# Patient Record
Sex: FEMALE | Race: WHITE | NOT HISPANIC OR LATINO | Employment: OTHER | URBAN - METROPOLITAN AREA
[De-identification: names, ages, dates, MRNs, and addresses within clinical notes are randomized per-mention and may not be internally consistent; named-entity substitution may affect disease eponyms.]

---

## 2021-10-04 ENCOUNTER — ANNUAL EXAM (OUTPATIENT)
Dept: OBGYN CLINIC | Facility: CLINIC | Age: 86
End: 2021-10-04
Payer: MEDICARE

## 2021-10-04 VITALS
WEIGHT: 174 LBS | HEIGHT: 66 IN | SYSTOLIC BLOOD PRESSURE: 140 MMHG | BODY MASS INDEX: 27.97 KG/M2 | DIASTOLIC BLOOD PRESSURE: 80 MMHG

## 2021-10-04 DIAGNOSIS — K64.9 HEMORRHOIDS, UNSPECIFIED HEMORRHOID TYPE: ICD-10-CM

## 2021-10-04 DIAGNOSIS — L90.0 LICHEN SCLEROSUS ET ATROPHICUS: Primary | ICD-10-CM

## 2021-10-04 DIAGNOSIS — S32.9XXS CLOSED DISPLACED FRACTURE OF PELVIS, UNSPECIFIED PART OF PELVIS, SEQUELA: ICD-10-CM

## 2021-10-04 DIAGNOSIS — Z53.20 MAMMOGRAM DECLINED: ICD-10-CM

## 2021-10-04 PROCEDURE — 99214 OFFICE O/P EST MOD 30 MIN: CPT | Performed by: OBSTETRICS & GYNECOLOGY

## 2021-10-04 RX ORDER — TRIAMCINOLONE ACETONIDE 5 MG/G
OINTMENT TOPICAL
Qty: 30 G | Refills: 4 | Status: SHIPPED | OUTPATIENT
Start: 2021-10-04

## 2024-06-18 ENCOUNTER — ANNUAL EXAM (OUTPATIENT)
Dept: OBGYN CLINIC | Facility: CLINIC | Age: 89
End: 2024-06-18
Payer: MEDICARE

## 2024-06-18 VITALS
DIASTOLIC BLOOD PRESSURE: 82 MMHG | BODY MASS INDEX: 21.21 KG/M2 | SYSTOLIC BLOOD PRESSURE: 140 MMHG | WEIGHT: 132 LBS | HEIGHT: 66 IN

## 2024-06-18 DIAGNOSIS — N76.3 CHRONIC VULVITIS: ICD-10-CM

## 2024-06-18 DIAGNOSIS — Z01.411 ENCOUNTER FOR GYNECOLOGICAL EXAMINATION WITH ABNORMAL FINDING: Primary | ICD-10-CM

## 2024-06-18 DIAGNOSIS — L90.0 LICHEN SCLEROSUS ET ATROPHICUS: ICD-10-CM

## 2024-06-18 DIAGNOSIS — N95.2 ATROPHIC VAGINITIS: ICD-10-CM

## 2024-06-18 DIAGNOSIS — Z99.89 USES ROLLER WALKER: ICD-10-CM

## 2024-06-18 PROCEDURE — G0101 CA SCREEN;PELVIC/BREAST EXAM: HCPCS | Performed by: OBSTETRICS & GYNECOLOGY

## 2024-06-18 RX ORDER — CALCIUM ACETATE 667 MG/1
667 CAPSULE ORAL
COMMUNITY

## 2024-06-18 RX ORDER — AMLODIPINE BESYLATE 5 MG/1
5 TABLET ORAL DAILY
COMMUNITY
Start: 2024-03-28

## 2024-06-18 RX ORDER — VITS A,C,E/LUTEIN/MINERALS 300MCG-200
1 TABLET ORAL DAILY
COMMUNITY

## 2024-06-18 RX ORDER — HYDROXYCHLOROQUINE SULFATE 200 MG/1
200 TABLET, FILM COATED ORAL 2 TIMES DAILY
COMMUNITY
Start: 2024-05-07

## 2024-06-18 RX ORDER — TRIAMCINOLONE ACETONIDE 5 MG/G
OINTMENT TOPICAL
Qty: 30 G | Refills: 4 | Status: SHIPPED | OUTPATIENT
Start: 2024-06-18

## 2024-06-18 RX ORDER — LEVOTHYROXINE SODIUM 0.03 MG/1
25 TABLET ORAL DAILY
COMMUNITY

## 2024-06-18 NOTE — PROGRESS NOTES
[facility] - [address], [address], [place] [address]    ASSESSMENT/PLAN: Rhiannon Parisi is a 89 y.o.  who presents for annual gynecologic exam.    Encounter for routine gynecologic examination  - Routine well woman exam completed today.  - Cervical Cancer Screening: Current ASCCP Guidelines reviewed. Last Pap: 2020 . Next Pap Due: past age 65   - Breast Cancer Screening: Last Mammogram Not on file, declines   - Colorectal cancer screening was not ordered.  - The following were reviewed in today's visit: breast self exam, adequate intake of calcium and vitamin D, exercise, and healthy diet    Additional problems addressed during this visit:  1. Encounter for gynecological examination with abnormal finding  2. Lichen sclerosus et atrophicus  Comments:  Kenalog  ointment  bid   in 3 swipes for   3 weeks  week 4 off weeks  5-9 once a day  May use Aquaphor between  Orders:  -     triamcinolone (KENALOG) 0.5 % ointment; Apply to  Perineum as directed  Every other  Day for  First  Three weeks of the month .  Week  Four  Take off. May use Aquaphor healing ointment between.  3. Chronic vulvitis  4. Atrophic vaginitis  5. Uses roller walker  Comments:  Pt states lives alone and getting difficult.  DW pt social work  referral and [facility] on aging  Orders:  -     Ambulatory Referral to Social Work Care Management Program; Future      CC:  Annual Gynecologic Examination    HPI: Rhiannon Parisi is a 89 y.o.  who presents for annual gynecologic examination.  88 yo her for wellness exam.   Pt not seen in a few  years.  No bleeding.. bloating .  + diarrhea and can get very irritated.   Sore all the time.        The following portions of the patient's history were reviewed and updated as appropriate: She  has a past medical history of Colon polyps, Hemorrhoids, and Lichen sclerosus.  She  has a past surgical history that includes Cataract extraction, bilateral; Mammo  "(historical); DXA procedure(historical) (2019); Bartholin gland cyst excision (2020); and Back surgery.  Her family history is not on file.  She  reports that she has never smoked. She has never used smokeless tobacco. She reports that she does not currently use alcohol. She reports that she does not currently use drugs.  Current Outpatient Medications   Medication Sig Dispense Refill   • amLODIPine (NORVASC) 5 mg tablet Take 5 mg by mouth daily     • calcium acetate (PHOSLO) capsule Take 667 mg by mouth 3 (three) times a day with meals     • denosumab (PROLIA) 60 mg/mL Inject 60 mg under the skin once     • hydroxychloroquine (PLAQUENIL) 200 mg tablet Take 200 mg by mouth 2 (two) times a day     • levothyroxine 25 mcg tablet Take 25 mcg by mouth daily     • metoprolol tartrate (LOPRESSOR) 25 mg tablet Take 12.5 mg by mouth     • Ocuvite-Lutein (OCUVITE-LUTEIN) tablet Take 1 tablet by mouth daily     • triamcinolone (KENALOG) 0.5 % ointment Apply to  Perineum as directed  Every other  Day for  First  Three weeks of the month .  Week  Four  Take off. May use Aquaphor healing ointment between. 30 g 4   • vitamin A 2250 MCG (7500 UT) capsule Take 7,500 Units by mouth daily       No current facility-administered medications for this visit.     She has No Known Allergies..    Review of Systems:  All systems normal except as noted in HPI          Objective:  /82 (BP Location: Left arm, Patient Position: Sitting, Cuff Size: Standard)   Ht 5' 6\" (1.676 m)   Wt 59.9 kg (132 lb)   BMI 21.31 kg/m²    Physical Exam  Vitals and nursing note reviewed.   Constitutional:       Appearance: Normal appearance.   HENT:      Head: Normocephalic.   Cardiovascular:      Rate and Rhythm: Rhythm irregular.      Pulses: Normal pulses.   Pulmonary:      Effort: Pulmonary effort is normal.      Breath sounds: Normal breath sounds.   Chest:      Chest wall: No mass, lacerations, swelling, tenderness or edema.   Breasts:     Bijan " Score is 4.      Breasts are symmetrical.      Right: Normal. No swelling, bleeding, inverted nipple, mass, nipple discharge, skin change or tenderness.      Left: No swelling, bleeding, inverted nipple, mass, nipple discharge, skin change or tenderness.   Abdominal:      General: Abdomen is flat. Bowel sounds are normal.      Palpations: Abdomen is soft.   Genitourinary:     General: Normal vulva.      Exam position: Lithotomy position.      Pubic Area: No rash.       Bijan stage (genital): 4.      Labia:         Right: Rash present. No tenderness or lesion.         Left: Rash present. No tenderness or lesion.       Urethra: No urethral pain, urethral swelling or urethral lesion.      Vagina: Normal.      Cervix: No cervical motion tenderness or discharge.      Uterus: Normal.       Adnexa: Right adnexa normal and left adnexa normal.      Rectum: Normal.          Comments: Bilateral marked  erythema    Whitening at the intro oitus   Musculoskeletal:         General: Normal range of motion.      Cervical back: Neck supple.   Lymphadenopathy:      Cervical: No cervical adenopathy.      Left cervical: No posterior cervical adenopathy.      Upper Body:      Right upper body: No supraclavicular, axillary or pectoral adenopathy.      Left upper body: No supraclavicular, axillary or pectoral adenopathy.      Lower Body: No right inguinal adenopathy. No left inguinal adenopathy.   Skin:     General: Skin is warm and dry.   Neurological:      General: No focal deficit present.      Mental Status: She is alert and oriented to person, place, and time.   Psychiatric:         Mood and Affect: Mood normal.         Behavior: Behavior normal.         Thought Content: Thought content normal.         Judgment: Judgment normal.

## 2024-06-18 NOTE — PATIENT INSTRUCTIONS
Calcium 1200-1500mg + 600-1000 IU Vit D daily. Exercise 150 minutes per week minimum including weight bearing exercises. Pap with high risk HPV Q 3-5 years.  Annual mammogram and monthly breast self exam recommended.  Colonoscopy- negative     . Kegels 20 times twice daily. Silicone based lubricant with sex. Vaginal moisturizers twice weekly as needed.    Apply twice daily sparingly  In  3 swipes  For  3  Weeks.  Week  4 off.  Weeks  5-7 once a day at bedtime, week 8 off, weeks 9-11 every other day and week 12 off.  May use Aquaphor between.

## 2024-06-20 ENCOUNTER — PATIENT OUTREACH (OUTPATIENT)
Dept: OBGYN CLINIC | Facility: CLINIC | Age: 89
End: 2024-06-20

## 2024-06-20 NOTE — PROGRESS NOTES
"LUCA referred for assistance with services - per note from GEORGI Milton, patient is having trouble navigating living alone. SW called patient at number on file, but received the message \"Please enter your remote access code\". No ability to leave VM. LUCA tried again and was able to connect with patient.     Patient denies current financial stressors. She is independent with all ADLs. Relies on her niece for transportation and help cleaning around the house as needed. She denies wanting any services in the home or resources for different living arrangements (lives alone in a modular home). States she still feels safe at home taking care of daily needs.     Patient did indicate wanting Meals on Wheels as her niece only takes her shopping once a week and patient is limited on the kinds of meals she can make. She states her daughter tried applying for her in the past, but she was denied because she has someone who can take her shopping. SW offered to look into this for patient and get back to her.       "

## 2024-06-21 ENCOUNTER — PATIENT OUTREACH (OUTPATIENT)
Dept: OBGYN CLINIC | Facility: CLINIC | Age: 89
End: 2024-06-21

## 2024-06-21 NOTE — PROGRESS NOTES
[employer] lists several Meals on Wheels options for [facility]. Closest locations are [facility] ([place]) and [facility] ([place]). SW called [facility] first and left  for Meals on Wheels  Jennifer ([phone] ext. [phone]).    SW then called [facility] ([phone]). Spoke with Meals on Wheels coordinator Evelyn, who stated patient needs to be evaluated by [facility] Senior [facility] ([phone])  first to determine eligibility. If patient is eligible, she will be referred to [facility] and they can delivery one hot meal, one cold meal, and milk daily.     SW called Office of Senior Services, spoke with  who stated that SW will need to leave a VM on their Enrollement Services line and a coordinator will get back to  in 48-72hrs. SW was transferred and  was msaood requesting a call back to place referral.     SW called patient to inform her of same. Patient provided verbal consent to provide information necessary to complete referral.

## 2024-06-24 ENCOUNTER — PATIENT OUTREACH (OUTPATIENT)
Dept: OBGYN CLINIC | Facility: CLINIC | Age: 89
End: 2024-06-24

## 2024-06-24 NOTE — PROGRESS NOTES
Roslyn ZARCO from [facility] Office of Senior Services returned LUCA's call from last week. LUCA attempted to answer but call was immediately directed to . Roslyn left message requesting a call back with patient's name, , and phone number to complete assessment with patient by phone.    LUCA called Roslyn back and left message with requested information. LUCA also left SW's contact information if needed for f/u call.

## 2024-06-26 ENCOUNTER — PATIENT OUTREACH (OUTPATIENT)
Dept: OBGYN CLINIC | Facility: CLINIC | Age: 89
End: 2024-06-26

## 2024-06-26 NOTE — PROGRESS NOTES
LUCA called patient to f/u on MOW referral. Patient confirmed she was contacted by Roslyn from [facility], who determined that patient is NOT eligible for MOW due to having family who drive her to the store (niece) and bring her meals during the week. Patient denied additional needs at this time. SW provided patient with contact information should she require additional resources later on.

## 2024-07-28 NOTE — PROGRESS NOTES
PROBLEM GYNECOLOGICAL VISIT    Rhiannon Parisi is a 89 y.o. female who presents today for  fu for  skin changes and  burning.   Her general medical history has been reviewed and she reports it as follows:    Past Medical History:   Diagnosis Date   • Colon polyps    • Hemorrhoids    • Lichen sclerosus      Past Surgical History:   Procedure Laterality Date   • BACK SURGERY      2021   • BARTHOLIN GLAND CYST EXCISION     • CATARACT EXTRACTION, BILATERAL     • DXA PROCEDURE (HISTORICAL)  2019    FOLLOWED BY  rHEUMATOLOGY   • MAMMO (HISTORICAL)      DECLINES     OB History        3    Para   3    Term                AB        Living   3       SAB        IAB        Ectopic        Multiple        Live Births                   Social History     Tobacco Use   • Smoking status: Never   • Smokeless tobacco: Never   Vaping Use   • Vaping status: Never Used   Substance Use Topics   • Alcohol use: Not Currently   • Drug use: Not Currently       Current Outpatient Medications   Medication Instructions   • amLODIPine (NORVASC) 5 mg, Oral, Daily   • calcium acetate (PHOSLO) 667 mg, Oral, 3 times daily with meals   • denosumab (PROLIA) 60 mg, Subcutaneous, Once   • hydroxychloroquine (PLAQUENIL) 200 mg, Oral, 2 times daily   • levothyroxine 25 mcg, Oral, Daily   • metoprolol tartrate (LOPRESSOR) 12.5 mg, Oral   • Ocuvite-Lutein (OCUVITE-LUTEIN) tablet 1 tablet, Oral, Daily   • triamcinolone (KENALOG) 0.5 % ointment Apply to  Perineum as directed  Every other  Day for  First  Three weeks of the month .  Week  Four  Take off. May use Aquaphor healing ointment between.   • vitamin A 7,500 Units, Oral, Daily       History of Present Illness:   Pt seen  2024 and noted  was  vulvar skin changes.  Started on    steroid ointment .  No real itching can have  burning off and on     Review of Systems:  Review of Systems   Constitutional: Negative.    Gastrointestinal: Negative.    Genitourinary:  Negative for  "decreased urine volume, difficulty urinating, dyspareunia, dysuria, enuresis, flank pain, frequency, menstrual problem, pelvic pain, urgency, vaginal bleeding, vaginal discharge and vaginal pain.        Vulvar  burningoff and on better w  use of steroid ointment once a day   Musculoskeletal: Negative.    Neurological: Negative.    Hematological: Negative.    Psychiatric/Behavioral: Negative.         Physical Exam:  /70 (BP Location: Left arm, Patient Position: Sitting, Cuff Size: Standard)   Ht 5' 6\" (1.676 m)   Wt 59.9 kg (132 lb)   BMI 21.31 kg/m²   Physical Exam  Constitutional:       Appearance: Normal appearance.   Genitourinary:      Rectum normal.      No lesions in the vagina.      Right Labia: No rash, tenderness, lesions or skin changes.     Left Labia: No tenderness, lesions, skin changes or rash.           No inguinal adenopathy present in the right or left side.     Pelvic Bijan Score: 4/5.     No vaginal discharge, erythema, tenderness, bleeding, ulceration or granulation tissue.      No vaginal prolapse present.     No vaginal atrophy present.       Right Adnexa: not tender, not full and no mass present.     Left Adnexa: not tender, not full and no mass present.  Abdominal:      General: Abdomen is flat. Bowel sounds are normal.      Palpations: Abdomen is soft.      Hernia: There is no hernia in the left inguinal area or right inguinal area.   Lymphadenopathy:      Lower Body: No right inguinal adenopathy. No left inguinal adenopathy.   Neurological:      Mental Status: She is alert.   Vitals and nursing note reviewed.         Assessment:   1. Vulvar skin changes, L/S    Plan: fu in 6 mo    Continue with steroid ointment   once a day   for  3 weeks on then one  week off.  Then  go to  4 swipes as directed  three times a week. If increase in symptoms to go to bid .  Aquaphor healing ointment  between. I have spent a total time of 22 minutes in caring for this patient on the day of the " visit/encounter including Diagnostic results, Prognosis, Risks and benefits of tx options, Instructions for management, Patient and family education, Importance of tx compliance, Counseling / Coordination of care, Documenting in the medical record, Reviewing / ordering tests, medicine, procedures  , and Obtaining or reviewing history  .   Reviewed with patient that [facility] immediately, but that they will not necessarily be reviewed by me immediately.  Explained that I will review results at my earliest opportunity and contact patient appropriately.

## 2024-07-29 ENCOUNTER — OFFICE VISIT (OUTPATIENT)
Dept: OBGYN CLINIC | Facility: CLINIC | Age: 89
End: 2024-07-29
Payer: MEDICARE

## 2024-07-29 VITALS
DIASTOLIC BLOOD PRESSURE: 70 MMHG | BODY MASS INDEX: 21.21 KG/M2 | HEIGHT: 66 IN | WEIGHT: 132 LBS | SYSTOLIC BLOOD PRESSURE: 120 MMHG

## 2024-07-29 DIAGNOSIS — N76.3 CHRONIC VULVITIS: Primary | ICD-10-CM

## 2024-07-29 DIAGNOSIS — N95.2 ATROPHIC VAGINITIS: ICD-10-CM

## 2024-07-29 DIAGNOSIS — L90.0 LICHEN SCLEROSUS ET ATROPHICUS: ICD-10-CM

## 2024-07-29 DIAGNOSIS — Z99.89 USES ROLLER WALKER: ICD-10-CM

## 2024-07-29 PROCEDURE — 99213 OFFICE O/P EST LOW 20 MIN: CPT | Performed by: OBSTETRICS & GYNECOLOGY

## 2025-02-18 ENCOUNTER — OFFICE VISIT (OUTPATIENT)
Dept: OBGYN CLINIC | Facility: CLINIC | Age: OVER 89
End: 2025-02-18
Payer: MEDICARE

## 2025-02-18 VITALS
WEIGHT: 127.4 LBS | HEIGHT: 66 IN | DIASTOLIC BLOOD PRESSURE: 72 MMHG | SYSTOLIC BLOOD PRESSURE: 122 MMHG | BODY MASS INDEX: 20.48 KG/M2

## 2025-02-18 DIAGNOSIS — L90.0 LICHEN SCLEROSUS ET ATROPHICUS: Primary | ICD-10-CM

## 2025-02-18 DIAGNOSIS — N76.3 CHRONIC VULVITIS: ICD-10-CM

## 2025-02-18 DIAGNOSIS — Z99.89 USES ROLLER WALKER: ICD-10-CM

## 2025-02-18 DIAGNOSIS — L90.0 LICHEN SCLEROSUS ET ATROPHICUS: ICD-10-CM

## 2025-02-18 DIAGNOSIS — N95.2 ATROPHIC VAGINITIS: ICD-10-CM

## 2025-02-18 PROCEDURE — 99213 OFFICE O/P EST LOW 20 MIN: CPT | Performed by: OBSTETRICS & GYNECOLOGY

## 2025-02-18 RX ORDER — TRIAMCINOLONE ACETONIDE 5 MG/G
OINTMENT TOPICAL
Qty: 30 G | Refills: 4 | Status: SHIPPED | OUTPATIENT
Start: 2025-02-18

## 2025-02-18 NOTE — PROGRESS NOTES
PROBLEM GYNECOLOGICAL VISIT    Rhiannon Parisi is a 89 y.o. female who presents today for gyn fu .  Hx of  vulvar sin changes .   Her general medical history has been reviewed and she reports it as follows:    Past Medical History:   Diagnosis Date   • Colon polyps    • Hemorrhoids    • Lichen sclerosus      Past Surgical History:   Procedure Laterality Date   • BACK SURGERY      2021   • BARTHOLIN GLAND CYST EXCISION     • CATARACT EXTRACTION, BILATERAL     • DXA PROCEDURE (HISTORICAL)  2019    FOLLOWED BY  rHEUMATOLOGY   • MAMMO (HISTORICAL)      DECLINES     OB History        3    Para   3    Term                AB        Living   3       SAB        IAB        Ectopic        Multiple        Live Births                   Social History     Tobacco Use   • Smoking status: Never   • Smokeless tobacco: Never   Vaping Use   • Vaping status: Never Used   Substance Use Topics   • Alcohol use: Not Currently   • Drug use: Not Currently       Current Outpatient Medications   Medication Instructions   • amLODIPine (NORVASC) 5 mg, Daily   • calcium acetate (PHOSLO) 667 mg, 3 times daily with meals   • denosumab (PROLIA) 60 mg, Once   • hydroxychloroquine (PLAQUENIL) 200 mg, 2 times daily   • levothyroxine 25 mcg, Daily   • metoprolol tartrate (LOPRESSOR) 12.5 mg   • Ocuvite-Lutein (OCUVITE-LUTEIN) tablet 1 tablet, Daily   • triamcinolone (KENALOG) 0.5 % ointment Apply to  Perineum as directed  Every other  Day for  First  Three weeks of the month .  Week  Four  Take off. May use Aquaphor healing ointment between.   • vitamin A 7,500 Units, Daily       History of Present Illness:   Using  steroid cream  every other day at night in  3 swipes.   No itching or burning.  No bleeding.  Using Aquaphor  between.  Not  sexually active.  Lives alone  .   No dysuria  + loose stools     Review of Systems:  Review of Systems   Constitutional: Negative.    Gastrointestinal:  Positive for diarrhea. Negative for  "abdominal distention, abdominal pain and rectal pain.   Endocrine: Negative.    Genitourinary: Negative.         No itching or burning    Musculoskeletal: Negative.    Skin: Negative.    Allergic/Immunologic: Negative.    Neurological: Negative.    Hematological: Negative.    Psychiatric/Behavioral: Negative.         Physical Exam:  /72 (BP Location: Left arm, Patient Position: Sitting, Cuff Size: Standard)   Ht 5' 6\" (1.676 m)   Wt 57.8 kg (127 lb 6.4 oz)   BMI 20.56 kg/m²   Physical Exam  Constitutional:       Appearance: Normal appearance.   Genitourinary:      Urethral meatus normal.      Genitourinary Comments: 1. Erythema noted   due to contact     2   small cyst    3 noted pale  gray       Right Labia: No rash, lesions or skin changes.     Left Labia: Bartholin's cyst.      Left Labia: No tenderness, lesions or rash.           No inguinal adenopathy present in the right side.     Pelvic Bijan Score: 4/5.     No vaginal erythema or bleeding.      No vaginal prolapse present.     Moderate vaginal atrophy present.     No cervical motion tenderness.      Uterus is not enlarged.      No urethral tenderness present.      Bladder is not tender, urgency on palpation not present and masses not present.       Pelvic exam was performed with patient in the lithotomy position.   Rectum:      External hemorrhoid present.   Abdominal:      General: Abdomen is flat. Bowel sounds are normal.      Palpations: Abdomen is soft.      Hernia: There is no hernia in the left inguinal area or right inguinal area.   Musculoskeletal:         General: Normal range of motion.   Lymphadenopathy:      Lower Body: No right inguinal adenopathy.   Neurological:      General: No focal deficit present.      Mental Status: She is alert and oriented to person, place, and time.   Skin:     General: Skin is warm and dry.   Psychiatric:         Mood and Affect: Mood normal.         Behavior: Behavior normal.         Thought Content: " Thought content normal.         Judgment: Judgment normal.   Vitals and nursing note reviewed.         Assessment:   1. History of lichen sclerosis    chronic vulvitis       Plan: No  pads.  May use aquphor healing ointment   3 -4 x a day. Open to air  as much as possible.  Continue 3 swipes of Kenalog ointment   3 nights a week sparingly.  I have spent a total time of 25 minutes in caring for this patient on the day of the visit/encounter including Risks and benefits of tx options, Instructions for management, Patient and family education, Importance of tx compliance, Risk factor reductions, Documenting in the medical record, Reviewing/placing orders in the medical record (including tests, medications, and/or procedures), and Obtaining or reviewing history  .     Reviewed with patient that [facility], but that they will not necessarily be reviewed by me immediately.  Explained that I will review results at my earliest opportunity and contact patient appropriately.

## 2025-08-12 ENCOUNTER — OFFICE VISIT (OUTPATIENT)
Dept: OBGYN CLINIC | Facility: CLINIC | Age: OVER 89
End: 2025-08-12
Payer: MEDICARE

## 2025-08-21 PROBLEM — M54.30 SCIATICA: Status: ACTIVE | Noted: 2025-08-21

## 2025-08-21 PROBLEM — R52 INTRACTABLE PAIN: Status: ACTIVE | Noted: 2025-08-21

## 2025-08-21 PROBLEM — G89.29 CHRONIC PAIN: Status: ACTIVE | Noted: 2025-08-21

## 2025-08-21 PROBLEM — Z87.81 HISTORY OF PELVIC FRACTURE: Status: ACTIVE | Noted: 2025-08-21

## 2025-08-21 PROBLEM — K21.9 GERD (GASTROESOPHAGEAL REFLUX DISEASE): Status: ACTIVE | Noted: 2025-08-21

## 2025-08-21 PROBLEM — E03.9 HYPOTHYROIDISM: Status: ACTIVE | Noted: 2025-08-21

## 2025-08-21 PROBLEM — K63.5 POLYP OF COLON: Status: ACTIVE | Noted: 2025-08-21

## 2025-08-21 PROBLEM — R19.7 DIARRHEA: Status: ACTIVE | Noted: 2025-08-21

## 2025-08-21 PROBLEM — N28.9 RENAL INSUFFICIENCY: Status: ACTIVE | Noted: 2025-08-21

## 2025-08-21 PROBLEM — R60.0 EDEMA, PERIPHERAL: Status: ACTIVE | Noted: 2025-08-21

## 2025-08-21 PROBLEM — K62.5 RECTAL BLEEDING: Status: ACTIVE | Noted: 2025-08-21

## 2025-08-21 PROBLEM — E55.9 VITAMIN D DEFICIENCY: Status: ACTIVE | Noted: 2025-08-21

## 2025-08-21 PROBLEM — M48.00 SPINAL STENOSIS: Status: ACTIVE | Noted: 2025-08-21

## 2025-08-21 PROBLEM — I10 HYPERTENSION: Status: ACTIVE | Noted: 2025-08-21

## 2025-08-21 PROBLEM — R63.4 WEIGHT LOSS: Status: ACTIVE | Noted: 2025-08-21

## 2025-08-21 PROBLEM — M35.3 PMR (POLYMYALGIA RHEUMATICA) (HCC): Status: ACTIVE | Noted: 2025-08-21

## 2025-08-21 PROBLEM — R09.02 HYPOXIA: Status: ACTIVE | Noted: 2025-08-21

## 2025-08-21 PROBLEM — M81.0 OSTEOPOROSIS: Status: ACTIVE | Noted: 2025-08-21

## 2025-08-21 PROBLEM — M51.369 DDD (DEGENERATIVE DISC DISEASE), LUMBAR: Status: ACTIVE | Noted: 2025-08-21

## 2025-08-21 PROBLEM — R74.01 TRANSAMINITIS: Status: ACTIVE | Noted: 2025-08-21

## 2025-08-21 PROBLEM — H26.9 CATARACTS, BILATERAL: Status: ACTIVE | Noted: 2025-08-21

## 2025-08-21 PROBLEM — Z79.899: Status: ACTIVE | Noted: 2025-08-21

## 2025-08-21 PROBLEM — G47.00 INSOMNIA: Status: ACTIVE | Noted: 2025-08-21

## 2025-08-21 PROBLEM — M75.52 SUBACROMIAL BURSITIS OF LEFT SHOULDER JOINT: Status: ACTIVE | Noted: 2025-08-21

## 2025-08-21 PROBLEM — Z98.890 H/O LAMINECTOMY: Status: ACTIVE | Noted: 2025-08-21

## 2025-08-21 PROBLEM — K64.4 EXTERNAL HEMORRHOID, BLEEDING: Status: ACTIVE | Noted: 2025-08-21

## 2025-08-21 PROBLEM — N18.31 STAGE 3A CHRONIC KIDNEY DISEASE (CKD) (HCC): Status: ACTIVE | Noted: 2025-08-21

## 2025-08-21 PROBLEM — E78.5 HYPERLIPIDEMIA: Status: ACTIVE | Noted: 2025-08-21

## 2025-08-21 PROBLEM — H40.9 GLAUCOMA: Status: ACTIVE | Noted: 2025-08-21

## 2025-08-21 PROBLEM — B37.2 CANDIDAL DERMATITIS: Status: ACTIVE | Noted: 2025-08-21

## 2025-08-21 PROBLEM — D84.821: Status: ACTIVE | Noted: 2025-08-21

## 2025-08-21 PROBLEM — M54.50 LOW BACK PAIN: Status: ACTIVE | Noted: 2025-08-21

## 2025-08-21 PROBLEM — F32.A DEPRESSION: Status: ACTIVE | Noted: 2025-08-21

## 2025-08-21 PROBLEM — F41.1 GAD (GENERALIZED ANXIETY DISORDER): Status: ACTIVE | Noted: 2025-08-21

## 2025-08-21 PROBLEM — E66.3 OVERWEIGHT (BMI 25.0-29.9): Status: ACTIVE | Noted: 2025-08-21

## 2025-08-21 PROBLEM — I51.7 LVH (LEFT VENTRICULAR HYPERTROPHY): Status: ACTIVE | Noted: 2025-08-21
